# Patient Record
Sex: FEMALE | HISPANIC OR LATINO | URBAN - METROPOLITAN AREA
[De-identification: names, ages, dates, MRNs, and addresses within clinical notes are randomized per-mention and may not be internally consistent; named-entity substitution may affect disease eponyms.]

---

## 2022-10-11 ENCOUNTER — OFFICE VISIT (OUTPATIENT)
Dept: URBAN - METROPOLITAN AREA CLINIC 23 | Facility: CLINIC | Age: 27
End: 2022-10-11
Payer: MEDICAID

## 2022-10-11 DIAGNOSIS — L93.2 OTHER LOCAL LUPUS ERYTHEMATOSUS: ICD-10-CM

## 2022-10-11 DIAGNOSIS — Z79.899 OTHER LONG TERM (CURRENT) DRUG THERAPY: Primary | ICD-10-CM

## 2022-10-11 DIAGNOSIS — H04.123 DRY EYE SYNDROME OF BILATERAL LACRIMAL GLANDS: ICD-10-CM

## 2022-10-11 PROCEDURE — 92004 COMPRE OPH EXAM NEW PT 1/>: CPT | Performed by: STUDENT IN AN ORGANIZED HEALTH CARE EDUCATION/TRAINING PROGRAM

## 2022-10-11 PROCEDURE — 92134 CPTRZ OPH DX IMG PST SGM RTA: CPT | Performed by: STUDENT IN AN ORGANIZED HEALTH CARE EDUCATION/TRAINING PROGRAM

## 2022-10-11 ASSESSMENT — KERATOMETRY
OD: 42.00
OS: 42.00

## 2022-10-11 ASSESSMENT — INTRAOCULAR PRESSURE
OD: 16
OS: 16

## 2022-10-11 NOTE — IMPRESSION/PLAN
Impression: Other long term (current) drug therapy: Z79.899.
-- Plaquenil 200mg daily for SLE x 2017
-- daily dose (wt 128lbs): 3.44mg/kg Plan: Pt ed no evidence of plaquenil toxicty today. 
Fundus normal, will have pt return for AF 
OCT mac normal, ez intact OU
RTC next available for HVF 10-2 OU

## 2022-10-17 ENCOUNTER — TESTING ONLY (OUTPATIENT)
Dept: URBAN - METROPOLITAN AREA CLINIC 23 | Facility: CLINIC | Age: 27
End: 2022-10-17
Payer: MEDICAID

## 2022-10-17 DIAGNOSIS — Z79.899 OTHER LONG TERM (CURRENT) DRUG THERAPY: Primary | ICD-10-CM

## 2022-10-17 PROCEDURE — 92083 EXTENDED VISUAL FIELD XM: CPT | Performed by: STUDENT IN AN ORGANIZED HEALTH CARE EDUCATION/TRAINING PROGRAM

## 2023-11-15 DIAGNOSIS — Z79.899 OTHER LONG TERM (CURRENT) DRUG THERAPY: Primary | ICD-10-CM

## 2023-11-15 DIAGNOSIS — H16.223 KERATOCONJUNCTIVITIS SICCA, NOT SPECIFIED AS SJOGREN'S, BILATERAL: ICD-10-CM

## 2023-11-15 DIAGNOSIS — M32.10 SYSTEMIC LUPUS ERYTHEMATOSUS, ORGAN OR SYSTEM INVOLVEMENT UNSPECIFIED: ICD-10-CM

## 2023-11-15 PROCEDURE — 92083 EXTENDED VISUAL FIELD XM: CPT | Performed by: OPTOMETRIST

## 2023-11-15 PROCEDURE — 99214 OFFICE O/P EST MOD 30 MIN: CPT | Performed by: OPTOMETRIST

## 2023-11-15 PROCEDURE — 99204 OFFICE O/P NEW MOD 45 MIN: CPT | Performed by: OPTOMETRIST

## 2023-11-15 PROCEDURE — 92134 CPTRZ OPH DX IMG PST SGM RTA: CPT | Performed by: OPTOMETRIST

## 2025-01-28 ENCOUNTER — OFFICE VISIT (OUTPATIENT)
Dept: URBAN - METROPOLITAN AREA CLINIC 23 | Facility: CLINIC | Age: 30
End: 2025-01-28
Payer: MEDICAID

## 2025-01-28 DIAGNOSIS — H16.223 KERATOCONJUNCTIVITIS SICCA, NOT SPECIFIED AS SJOGREN'S, BILATERAL: ICD-10-CM

## 2025-01-28 DIAGNOSIS — M32.10 SYSTEMIC LUPUS ERYTHEMATOSUS, ORGAN OR SYSTEM INVOLVEMENT UNSPECIFIED: ICD-10-CM

## 2025-01-28 DIAGNOSIS — Z79.899 OTHER LONG TERM (CURRENT) DRUG THERAPY: Primary | ICD-10-CM

## 2025-01-28 PROCEDURE — 92134 CPTRZ OPH DX IMG PST SGM RTA: CPT | Performed by: OPTOMETRIST

## 2025-01-28 PROCEDURE — 92014 COMPRE OPH EXAM EST PT 1/>: CPT | Performed by: OPTOMETRIST

## 2025-01-28 ASSESSMENT — INTRAOCULAR PRESSURE
OS: 18
OD: 18

## 2025-01-28 ASSESSMENT — KERATOMETRY
OS: 41.38
OD: 41.88

## 2025-02-27 ENCOUNTER — OFFICE VISIT (OUTPATIENT)
Dept: URBAN - METROPOLITAN AREA CLINIC 23 | Facility: CLINIC | Age: 30
End: 2025-02-27
Payer: MEDICAID

## 2025-02-27 DIAGNOSIS — H16.223 KERATOCONJUNCTIVITIS SICCA, NOT SPECIFIED AS SJOGREN'S, BILATERAL: Primary | ICD-10-CM

## 2025-02-27 ASSESSMENT — KERATOMETRY
OS: 41.75
OD: 41.63

## 2025-02-27 ASSESSMENT — INTRAOCULAR PRESSURE
OS: 15
OD: 15

## 2025-08-20 ENCOUNTER — OFFICE VISIT (OUTPATIENT)
Dept: URBAN - METROPOLITAN AREA CLINIC 23 | Facility: CLINIC | Age: 30
End: 2025-08-20
Payer: MEDICAID

## 2025-08-20 DIAGNOSIS — H16.223 KERATOCONJUNCTIVITIS SICCA, NOT SPECIFIED AS SJOGREN'S, BILATERAL: Primary | ICD-10-CM

## 2025-08-20 PROCEDURE — 92014 COMPRE OPH EXAM EST PT 1/>: CPT | Performed by: OPTOMETRIST

## 2025-08-20 ASSESSMENT — KERATOMETRY
OD: 41.50
OS: 42.00

## 2025-08-20 ASSESSMENT — INTRAOCULAR PRESSURE
OS: 17
OD: 16